# Patient Record
Sex: MALE | Employment: FULL TIME | URBAN - METROPOLITAN AREA
[De-identification: names, ages, dates, MRNs, and addresses within clinical notes are randomized per-mention and may not be internally consistent; named-entity substitution may affect disease eponyms.]

---

## 2023-03-13 ENCOUNTER — TELEPHONE (OUTPATIENT)
Dept: PRIMARY CARE | Facility: CLINIC | Age: 53
End: 2023-03-13
Payer: COMMERCIAL

## 2023-04-13 ENCOUNTER — APPOINTMENT (OUTPATIENT)
Dept: PRIMARY CARE | Facility: CLINIC | Age: 53
End: 2023-04-13
Payer: COMMERCIAL

## 2023-05-05 ENCOUNTER — OFFICE VISIT (OUTPATIENT)
Dept: PRIMARY CARE | Facility: CLINIC | Age: 53
End: 2023-05-05
Payer: COMMERCIAL

## 2023-05-05 ENCOUNTER — LAB (OUTPATIENT)
Dept: LAB | Facility: LAB | Age: 53
End: 2023-05-05
Payer: COMMERCIAL

## 2023-05-05 VITALS
OXYGEN SATURATION: 98 % | BODY MASS INDEX: 23.7 KG/M2 | WEIGHT: 160 LBS | SYSTOLIC BLOOD PRESSURE: 104 MMHG | HEIGHT: 69 IN | DIASTOLIC BLOOD PRESSURE: 66 MMHG | TEMPERATURE: 97.4 F | HEART RATE: 78 BPM

## 2023-05-05 DIAGNOSIS — E78.2 MIXED HYPERLIPIDEMIA: ICD-10-CM

## 2023-05-05 DIAGNOSIS — Z12.5 PROSTATE CANCER SCREENING: ICD-10-CM

## 2023-05-05 DIAGNOSIS — R73.01 IMPAIRED FASTING GLUCOSE: ICD-10-CM

## 2023-05-05 DIAGNOSIS — E55.9 VITAMIN D DEFICIENCY: ICD-10-CM

## 2023-05-05 DIAGNOSIS — Z12.11 SCREEN FOR COLON CANCER: ICD-10-CM

## 2023-05-05 DIAGNOSIS — Z00.01 ANNUAL VISIT FOR GENERAL ADULT MEDICAL EXAMINATION WITH ABNORMAL FINDINGS: ICD-10-CM

## 2023-05-05 DIAGNOSIS — I73.00 RAYNAUD'S DISEASE WITHOUT GANGRENE: ICD-10-CM

## 2023-05-05 DIAGNOSIS — Z00.01 ANNUAL VISIT FOR GENERAL ADULT MEDICAL EXAMINATION WITH ABNORMAL FINDINGS: Primary | ICD-10-CM

## 2023-05-05 DIAGNOSIS — E03.9 ACQUIRED HYPOTHYROIDISM: ICD-10-CM

## 2023-05-05 DIAGNOSIS — M25.561 ACUTE PAIN OF RIGHT KNEE: ICD-10-CM

## 2023-05-05 PROBLEM — E78.5 HYPERLIPIDEMIA: Status: ACTIVE | Noted: 2023-05-05

## 2023-05-05 LAB
ALANINE AMINOTRANSFERASE (SGPT) (U/L) IN SER/PLAS: 10 U/L (ref 10–52)
ALBUMIN (G/DL) IN SER/PLAS: 4.6 G/DL (ref 3.4–5)
ALKALINE PHOSPHATASE (U/L) IN SER/PLAS: 68 U/L (ref 33–120)
ANION GAP IN SER/PLAS: 11 MMOL/L (ref 10–20)
ASPARTATE AMINOTRANSFERASE (SGOT) (U/L) IN SER/PLAS: 12 U/L (ref 9–39)
BILIRUBIN TOTAL (MG/DL) IN SER/PLAS: 1.9 MG/DL (ref 0–1.2)
CALCIDIOL (25 OH VITAMIN D3) (NG/ML) IN SER/PLAS: 64 NG/ML
CALCIUM (MG/DL) IN SER/PLAS: 9.9 MG/DL (ref 8.6–10.6)
CARBON DIOXIDE, TOTAL (MMOL/L) IN SER/PLAS: 30 MMOL/L (ref 21–32)
CHLORIDE (MMOL/L) IN SER/PLAS: 105 MMOL/L (ref 98–107)
CHOLESTEROL (MG/DL) IN SER/PLAS: 195 MG/DL (ref 0–199)
CHOLESTEROL IN HDL (MG/DL) IN SER/PLAS: 64.7 MG/DL
CHOLESTEROL/HDL RATIO: 3
CREATININE (MG/DL) IN SER/PLAS: 0.88 MG/DL (ref 0.5–1.3)
ERYTHROCYTE DISTRIBUTION WIDTH (RATIO) BY AUTOMATED COUNT: 13 % (ref 11.5–14.5)
ERYTHROCYTE MEAN CORPUSCULAR HEMOGLOBIN CONCENTRATION (G/DL) BY AUTOMATED: 32.1 G/DL (ref 32–36)
ERYTHROCYTE MEAN CORPUSCULAR VOLUME (FL) BY AUTOMATED COUNT: 94 FL (ref 80–100)
ERYTHROCYTES (10*6/UL) IN BLOOD BY AUTOMATED COUNT: 4.29 X10E12/L (ref 4.5–5.9)
ESTIMATED AVERAGE GLUCOSE FOR HBA1C: 114 MG/DL
GFR MALE: >90 ML/MIN/1.73M2
GLUCOSE (MG/DL) IN SER/PLAS: 100 MG/DL (ref 74–99)
HEMATOCRIT (%) IN BLOOD BY AUTOMATED COUNT: 40.2 % (ref 41–52)
HEMOGLOBIN (G/DL) IN BLOOD: 12.9 G/DL (ref 13.5–17.5)
HEMOGLOBIN A1C/HEMOGLOBIN TOTAL IN BLOOD: 5.6 %
LDL: 115 MG/DL (ref 0–99)
LEUKOCYTES (10*3/UL) IN BLOOD BY AUTOMATED COUNT: 5.3 X10E9/L (ref 4.4–11.3)
MUCUS, URINE: NORMAL /LPF
NRBC (PER 100 WBCS) BY AUTOMATED COUNT: 0 /100 WBC (ref 0–0)
PLATELETS (10*3/UL) IN BLOOD AUTOMATED COUNT: 257 X10E9/L (ref 150–450)
POTASSIUM (MMOL/L) IN SER/PLAS: 4.6 MMOL/L (ref 3.5–5.3)
PROSTATE SPECIFIC ANTIGEN,SCREEN: 0.33 NG/ML (ref 0–4)
PROTEIN TOTAL: 7.1 G/DL (ref 6.4–8.2)
RBC, URINE: 1 /HPF (ref 0–5)
SODIUM (MMOL/L) IN SER/PLAS: 141 MMOL/L (ref 136–145)
SQUAMOUS EPITHELIAL CELLS, URINE: 1 /HPF
THYROTROPIN (MIU/L) IN SER/PLAS BY DETECTION LIMIT <= 0.05 MIU/L: 3.8 MIU/L (ref 0.44–3.98)
TRIGLYCERIDE (MG/DL) IN SER/PLAS: 78 MG/DL (ref 0–149)
URATE (MG/DL) IN SER/PLAS: 5.9 MG/DL (ref 4–7.5)
UREA NITROGEN (MG/DL) IN SER/PLAS: 11 MG/DL (ref 6–23)
VLDL: 16 MG/DL (ref 0–40)
WBC, URINE: 4 /HPF (ref 0–5)

## 2023-05-05 PROCEDURE — 80061 LIPID PANEL: CPT

## 2023-05-05 PROCEDURE — 99213 OFFICE O/P EST LOW 20 MIN: CPT | Performed by: INTERNAL MEDICINE

## 2023-05-05 PROCEDURE — 84443 ASSAY THYROID STIM HORMONE: CPT

## 2023-05-05 PROCEDURE — 84550 ASSAY OF BLOOD/URIC ACID: CPT

## 2023-05-05 PROCEDURE — 1036F TOBACCO NON-USER: CPT | Performed by: INTERNAL MEDICINE

## 2023-05-05 PROCEDURE — 82306 VITAMIN D 25 HYDROXY: CPT

## 2023-05-05 PROCEDURE — 85027 COMPLETE CBC AUTOMATED: CPT

## 2023-05-05 PROCEDURE — 84153 ASSAY OF PSA TOTAL: CPT

## 2023-05-05 PROCEDURE — 99396 PREV VISIT EST AGE 40-64: CPT | Performed by: INTERNAL MEDICINE

## 2023-05-05 PROCEDURE — 80053 COMPREHEN METABOLIC PANEL: CPT

## 2023-05-05 PROCEDURE — 83036 HEMOGLOBIN GLYCOSYLATED A1C: CPT

## 2023-05-05 PROCEDURE — 81001 URINALYSIS AUTO W/SCOPE: CPT

## 2023-05-05 PROCEDURE — 36415 COLL VENOUS BLD VENIPUNCTURE: CPT

## 2023-05-05 RX ORDER — MELOXICAM 15 MG/1
15 TABLET ORAL DAILY
Qty: 30 TABLET | Refills: 0 | Status: SHIPPED | OUTPATIENT
Start: 2023-05-05 | End: 2023-06-01

## 2023-05-05 RX ORDER — ERGOCALCIFEROL 1.25 MG/1
1 CAPSULE ORAL
COMMUNITY
Start: 2023-03-22

## 2023-05-05 ASSESSMENT — PATIENT HEALTH QUESTIONNAIRE - PHQ9
SUM OF ALL RESPONSES TO PHQ9 QUESTIONS 1 AND 2: 0
2. FEELING DOWN, DEPRESSED OR HOPELESS: NOT AT ALL
1. LITTLE INTEREST OR PLEASURE IN DOING THINGS: NOT AT ALL

## 2023-05-05 NOTE — ASSESSMENT & PLAN NOTE
52-year-old male advised skin cancer prostate cancer testicular cancer colon cancer screening flu pneumonia COVID-19 vaccines follow-up 6 months refer patient to GI

## 2023-05-05 NOTE — ASSESSMENT & PLAN NOTE
Posttraumatic injury advised x-ray of the right knee meloxicam 50 mg a day side effect explained take vitamin C vitamin D calcium supplement physical therapy follow-up

## 2023-05-08 ENCOUNTER — TELEPHONE (OUTPATIENT)
Dept: PRIMARY CARE | Facility: CLINIC | Age: 53
End: 2023-05-08
Payer: COMMERCIAL

## 2023-05-08 NOTE — TELEPHONE ENCOUNTER
----- Message from Luke Livingston MD sent at 5/8/2023  9:53 AM EDT -----  LDL cholesterol 115 advised exercise low-fat diet glucose 100 advised low-carb diet H&H is low 12.9 and 40.2 at age of 52 advised GI evaluation with EGD colonoscopy Dr. Anderosn follow-up 6 months repeat lipid sugar and blood count

## 2023-05-17 ENCOUNTER — APPOINTMENT (OUTPATIENT)
Dept: PRIMARY CARE | Facility: CLINIC | Age: 53
End: 2023-05-17
Payer: COMMERCIAL

## 2023-05-30 ASSESSMENT — ENCOUNTER SYMPTOMS
SHORTNESS OF BREATH: 0
CONSTITUTIONAL NEGATIVE: 1
WHEEZING: 0

## 2023-05-30 NOTE — PROGRESS NOTES
Subjective   Patient ID: Ramon Thopre is a 52 y.o. male who presents for No chief complaint on file..    Is pt not seeing dr alexandre anymore?  He just saw him for a physical.  Last physical: 5/5/23  Last labs-5/5/23 uric acid nl, tsh nl, psa nl, a1c 5.6, ldl 115, sugar 100, rbc/h/h low  Due for iron labs    new pt-charles-gi, others?  Did pt see gi dr?  Put names of providers in care team-not last pcp  Did pt set up colonoscopy?  Tdap-has pt had a tdap since 2001? If not would he like to update this    Family history form    Is he looking for an order for cardiac calcium score?  If yes, any FH of heart attack or heart surgery?      HPI      No care team member to display     Review of Systems   Constitutional: Negative.    Respiratory:  Negative for shortness of breath and wheezing.    Cardiovascular:  Negative for chest pain.     Objective   There were no vitals taken for this visit.   BP Readings from Last 3 Encounters:   05/05/23 104/66   02/24/22 116/77   08/30/21 103/68     Wt Readings from Last 3 Encounters:   05/05/23 72.6 kg (160 lb)   02/24/22 70.9 kg (156 lb 6.4 oz)   08/30/21 69.5 kg (153 lb 3.2 oz)       Physical Exam  Constitutional:       General: He is not in acute distress.     Appearance: Normal appearance.   Neurological:      Mental Status: He is alert.     Assessment/Plan   {Assess/PlanSmartLinks:10119}                No

## 2023-05-31 ENCOUNTER — APPOINTMENT (OUTPATIENT)
Dept: PRIMARY CARE | Facility: CLINIC | Age: 53
End: 2023-05-31
Payer: COMMERCIAL

## 2023-12-08 ENCOUNTER — TELEMEDICINE (OUTPATIENT)
Dept: PRIMARY CARE | Facility: CLINIC | Age: 53
End: 2023-12-08
Payer: COMMERCIAL

## 2023-12-08 DIAGNOSIS — J20.8 ACUTE BRONCHITIS DUE TO COVID-19 VIRUS: Primary | ICD-10-CM

## 2023-12-08 DIAGNOSIS — U07.1 ACUTE BRONCHITIS DUE TO COVID-19 VIRUS: Primary | ICD-10-CM

## 2023-12-08 PROBLEM — M25.561 RIGHT KNEE PAIN: Status: RESOLVED | Noted: 2023-05-05 | Resolved: 2023-12-08

## 2023-12-08 PROBLEM — E55.9 VITAMIN D DEFICIENCY: Status: RESOLVED | Noted: 2023-05-05 | Resolved: 2023-12-08

## 2023-12-08 PROBLEM — I73.00 RAYNAUD'S DISEASE WITHOUT GANGRENE: Status: RESOLVED | Noted: 2023-05-05 | Resolved: 2023-12-08

## 2023-12-08 PROBLEM — E78.5 HYPERLIPIDEMIA: Status: RESOLVED | Noted: 2023-05-05 | Resolved: 2023-12-08

## 2023-12-08 PROBLEM — Z00.01 ANNUAL VISIT FOR GENERAL ADULT MEDICAL EXAMINATION WITH ABNORMAL FINDINGS: Status: RESOLVED | Noted: 2023-05-05 | Resolved: 2023-12-08

## 2023-12-08 LAB
NON-UH HIE BASO COUNT: 0.03 X1000 (ref 0–0.2)
NON-UH HIE BASOS %: 0.4 %
NON-UH HIE COVID-19 BY PCR IP: NEGATIVE
NON-UH HIE D DIMER HS: 362 NG/ML FEU
NON-UH HIE DIFF?: NO
NON-UH HIE EOS COUNT: 0 X1000 (ref 0–0.5)
NON-UH HIE EOSIN %: 0 %
NON-UH HIE HCT: 37 % (ref 41–52)
NON-UH HIE HGB: 12.7 G/DL (ref 13.5–17.5)
NON-UH HIE INSTR WBC: 5.7
NON-UH HIE LDH: 146 UNIT/L (ref 120–246)
NON-UH HIE LYMPH %: 16.8 %
NON-UH HIE LYMPH COUNT: 0.96 X1000 (ref 1.2–4.8)
NON-UH HIE MCH: 30.9 PG (ref 27–34)
NON-UH HIE MCHC: 34.2 G/DL (ref 32–37)
NON-UH HIE MCV: 90.3 FL (ref 80–100)
NON-UH HIE MONO %: 12 %
NON-UH HIE MONO COUNT: 0.69 X1000 (ref 0.1–1)
NON-UH HIE MPV: 9.6 FL (ref 7.4–10.4)
NON-UH HIE NEUTROPHIL %: 70.7 %
NON-UH HIE NEUTROPHIL COUNT (ANC): 4.06 X1000 (ref 1.4–8.8)
NON-UH HIE NUCLEATED RBC: 0 /100WBC
NON-UH HIE PLATELET: 191 X10 (ref 150–450)
NON-UH HIE RAPID INFLUENZA A ANTIGEN TEST: POSITIVE
NON-UH HIE RAPID INFLUENZA B ANTIGEN TEST: NEGATIVE
NON-UH HIE RBC: 4.1 X10 (ref 4.7–6.1)
NON-UH HIE RDW: 12.9 % (ref 11.5–14.5)
NON-UH HIE RESPIRATORY SYNCYTIAL VIRUS: NEGATIVE
NON-UH HIE RFAB INT QC: PRESENT
NON-UH HIE RSV INT QC: PRESENT
NON-UH HIE WBC: 5.7 X10 (ref 4.5–11)

## 2023-12-08 PROCEDURE — 99212 OFFICE O/P EST SF 10 MIN: CPT | Performed by: INTERNAL MEDICINE

## 2023-12-08 RX ORDER — NIRMATRELVIR AND RITONAVIR 300-100 MG
KIT ORAL
Qty: 30 TABLET | Refills: 0 | Status: SHIPPED | OUTPATIENT
Start: 2023-12-08 | End: 2023-12-13

## 2023-12-08 NOTE — PROGRESS NOTES
Subjective   Patient ID: Ramon Thorpe is a 53 y.o. male who presents for Cough, Fever, and Fatigue.    Assessment/Plan     Problem List Items Addressed This Visit       Acute bronchitis due to COVID-19 virus - Primary    Relevant Medications    nirmatrelvir-ritonavir (Paxlovid) 300 mg (150 mg x 2)-100 mg tablet therapy pack       HPI 53-year-old patient complaining of the febrile illness onset acutely duration 2 days progressed acutely    Negative for loss of taste or smell    Negative for nausea vomiting diarrhea    Negative for recent traveling or skin rash    Complaining of sore throat cough congestion fever chills body ache did take the atenolol Advil does not get any better patient did receive the flu and COVID-vaccine before one of the family member have similar symptoms  Past Medical History:   Diagnosis Date    Deficiency of other specified B group vitamins 03/23/2020    Vitamin B12 deficiency    Other conditions influencing health status 11/26/2017    Knee fracture, left    Other malaise 05/24/2019    Malaise and fatigue    Pain in leg, unspecified 08/13/2014    Leg pain    Pain in right knee 11/26/2017    Arthralgia of both knees    Personal history of diseases of the blood and blood-forming organs and certain disorders involving the immune mechanism 05/24/2019    History of anemia    Personal history of other diseases of the respiratory system 03/23/2020    History of acute pharyngitis    Personal history of other diseases of the respiratory system 03/23/2020    History of common cold    Personal history of other infectious and parasitic diseases 12/20/2019    History of tinea cruris     Past Surgical History:   Procedure Laterality Date    OTHER SURGICAL HISTORY  08/30/2021    No history of surgery     No Known Allergies  Current Outpatient Medications   Medication Sig Dispense Refill    ergocalciferol (Vitamin D-2) 1.25 MG (88317 UT) capsule Take 1 capsule (1,250 mcg) by mouth.       nirmatrelvir-ritonavir (Paxlovid) 300 mg (150 mg x 2)-100 mg tablet therapy pack Take 2 tablets by mouth 2 times a day AND 1 tablet 2 times a day. Do all this for 5 days. Nirmatrelvir 2 tabs bid and ritonavir 1 tab bid. 30 tablet 0     No current facility-administered medications for this visit.     Family History   Problem Relation Name Age of Onset    Liver disease Father       Social History     Socioeconomic History    Marital status:      Spouse name: None    Number of children: None    Years of education: None    Highest education level: None   Occupational History    None   Tobacco Use    Smoking status: Never    Smokeless tobacco: Never   Substance and Sexual Activity    Alcohol use: Never    Drug use: Never    Sexual activity: None   Other Topics Concern    None   Social History Narrative    None     Social Determinants of Health     Financial Resource Strain: Not on file   Food Insecurity: Not on file   Transportation Needs: Not on file   Physical Activity: Not on file   Stress: Not on file   Social Connections: Not on file   Intimate Partner Violence: Not on file   Housing Stability: Not on file     Immunization History   Administered Date(s) Administered    [facility] SARS-CoV-2 Vaccination 12/08/2021    MMR vaccine, subcutaneous (MMR II) 02/20/2001    Moderna SARS-CoV-2 Vaccination 01/02/2022    Pfizer Purple Cap SARS-CoV-2 03/22/2021, 04/12/2021    Td (adult), unspecified 02/20/2001, 02/20/2001       Review of Systems  Review of systems is otherwise negative unless stated above or in history of present illness.    Objective   Visit Vitals  Smoking Status Never     .Doxy  This visit was completed via video audio relation to  covid 19 pandemic all issues as below that discuss and address but no physical exam was performed if it was felt that patient should be evaluated in clinic and they have been advised to follow . Patient verbally consented to visit and spent  more than 50% discuss about patient's  complaint of problem and plan    LDL 150s low-carb diet glucose 100 low fat low-carb diet mild anemia Slow Fe GI workup repeat CBC    No visits with results within 4 Month(s) from this visit.   Latest known visit with results is:   Lab on 05/05/2023   Component Date Value Ref Range Status    Vitamin D, 25-Hydroxy 05/05/2023 64  ng/mL Final    Prostate Specific Antigen,Screen 05/05/2023 0.33  0.00 - 4.00 ng/mL Final    TSH 05/05/2023 3.80  0.44 - 3.98 mIU/L Final    Cholesterol 05/05/2023 195  0 - 199 mg/dL Final    HDL 05/05/2023 64.7  mg/dL Final    Cholesterol/HDL Ratio 05/05/2023 3.0   Final    LDL 05/05/2023 115 (H)  0 - 99 mg/dL Final    VLDL 05/05/2023 16  0 - 40 mg/dL Final    Triglycerides 05/05/2023 78  0 - 149 mg/dL Final    Glucose 05/05/2023 100 (H)  74 - 99 mg/dL Final    Sodium 05/05/2023 141  136 - 145 mmol/L Final    Potassium 05/05/2023 4.6  3.5 - 5.3 mmol/L Final    Chloride 05/05/2023 105  98 - 107 mmol/L Final    Bicarbonate 05/05/2023 30  21 - 32 mmol/L Final    Anion Gap 05/05/2023 11  10 - 20 mmol/L Final    Urea Nitrogen 05/05/2023 11  6 - 23 mg/dL Final    Creatinine 05/05/2023 0.88  0.50 - 1.30 mg/dL Final    GFR MALE 05/05/2023 >90  >90 mL/min/1.73m2 Final    Calcium 05/05/2023 9.9  8.6 - 10.6 mg/dL Final    Albumin 05/05/2023 4.6  3.4 - 5.0 g/dL Final    Alkaline Phosphatase 05/05/2023 68  33 - 120 U/L Final    Total Protein 05/05/2023 7.1  6.4 - 8.2 g/dL Final    AST 05/05/2023 12  9 - 39 U/L Final    Total Bilirubin 05/05/2023 1.9 (H)  0.0 - 1.2 mg/dL Final    ALT (SGPT) 05/05/2023 10  10 - 52 U/L Final    WBC 05/05/2023 5.3  4.4 - 11.3 x10E9/L Final    nRBC 05/05/2023 0.0  0.0 - 0.0 /100 WBC Final    RBC 05/05/2023 4.29 (L)  4.50 - 5.90 x10E12/L Final    Hemoglobin 05/05/2023 12.9 (L)  13.5 - 17.5 g/dL Final    Hematocrit 05/05/2023 40.2 (L)  41.0 - 52.0 % Final    MCV 05/05/2023 94  80 - 100 fL Final    MCHC 05/05/2023 32.1  32.0 - 36.0 g/dL Final    Platelets 05/05/2023 257  150 -  450 x10E9/L Final    RDW 05/05/2023 13.0  11.5 - 14.5 % Final    Hemoglobin A1C 05/05/2023 5.6  % Final    Estimated Average Glucose 05/05/2023 114  MG/DL Final    Uric Acid 05/05/2023 5.9  4.0 - 7.5 mg/dL Final    WBC, Urine 05/05/2023 4  0 - 5 /HPF Final    RBC, Urine 05/05/2023 1  0 - 5 /HPF Final    Squamous Epithelial Cells, Urine 05/05/2023 1  /HPF Final    Mucus, Urine 05/05/2023 1+  /LPF Final       Radiology: Reviewed imaging in powerchart.  No results found.      Charting was completed using voice recognition technology and may include unintended errors.

## 2023-12-11 ENCOUNTER — TELEPHONE (OUTPATIENT)
Dept: PRIMARY CARE | Facility: CLINIC | Age: 53
End: 2023-12-11
Payer: COMMERCIAL

## 2023-12-11 DIAGNOSIS — J11.1 FLU: ICD-10-CM

## 2023-12-11 RX ORDER — OSELTAMIVIR PHOSPHATE 75 MG/1
75 CAPSULE ORAL 2 TIMES DAILY
Qty: 10 CAPSULE | Refills: 0 | Status: SHIPPED | OUTPATIENT
Start: 2023-12-11 | End: 2023-12-16

## 2024-01-29 ENCOUNTER — TELEPHONE (OUTPATIENT)
Dept: PRIMARY CARE | Facility: CLINIC | Age: 54
End: 2024-01-29
Payer: COMMERCIAL

## 2024-01-29 NOTE — TELEPHONE ENCOUNTER
Left  with information for him to call to schedule MRI. Sent order to central scheduling and left ref numbers for orthopedics in

## 2024-01-29 NOTE — TELEPHONE ENCOUNTER
PT was in to Dr Livingston and had an xray of right knee that showed no issues.  He would like a referral to ortho.  He still has pain.  He is also wondering if he should have an  MRI.    He requested to ask Dr Martin in Dr Livingston's absence.

## 2024-02-20 ENCOUNTER — TELEPHONE (OUTPATIENT)
Dept: PRIMARY CARE | Facility: CLINIC | Age: 54
End: 2024-02-20
Payer: COMMERCIAL

## 2024-08-26 ENCOUNTER — APPOINTMENT (OUTPATIENT)
Dept: PRIMARY CARE | Facility: CLINIC | Age: 54
End: 2024-08-26
Payer: COMMERCIAL

## 2024-08-26 VITALS
HEART RATE: 70 BPM | OXYGEN SATURATION: 98 % | BODY MASS INDEX: 24.59 KG/M2 | WEIGHT: 166 LBS | TEMPERATURE: 97.2 F | DIASTOLIC BLOOD PRESSURE: 67 MMHG | SYSTOLIC BLOOD PRESSURE: 103 MMHG | HEIGHT: 69 IN

## 2024-08-26 DIAGNOSIS — Z00.01 ANNUAL VISIT FOR GENERAL ADULT MEDICAL EXAMINATION WITH ABNORMAL FINDINGS: Primary | ICD-10-CM

## 2024-08-26 DIAGNOSIS — R73.01 IMPAIRED FASTING GLUCOSE: ICD-10-CM

## 2024-08-26 DIAGNOSIS — Z00.00 HEALTH CARE MAINTENANCE: ICD-10-CM

## 2024-08-26 DIAGNOSIS — R79.89 HIGH SERUM LOW-DENSITY LIPOPROTEIN (LDL): ICD-10-CM

## 2024-08-26 DIAGNOSIS — I10 HYPERTENSION, UNSPECIFIED TYPE: ICD-10-CM

## 2024-08-26 PROBLEM — U07.1 ACUTE BRONCHITIS DUE TO COVID-19 VIRUS: Status: RESOLVED | Noted: 2023-12-08 | Resolved: 2024-08-26

## 2024-08-26 PROBLEM — J20.8 ACUTE BRONCHITIS DUE TO COVID-19 VIRUS: Status: RESOLVED | Noted: 2023-12-08 | Resolved: 2024-08-26

## 2024-08-26 PROCEDURE — 3008F BODY MASS INDEX DOCD: CPT | Performed by: INTERNAL MEDICINE

## 2024-08-26 PROCEDURE — 3074F SYST BP LT 130 MM HG: CPT | Performed by: INTERNAL MEDICINE

## 2024-08-26 PROCEDURE — 3078F DIAST BP <80 MM HG: CPT | Performed by: INTERNAL MEDICINE

## 2024-08-26 PROCEDURE — 99396 PREV VISIT EST AGE 40-64: CPT | Performed by: INTERNAL MEDICINE

## 2024-08-26 PROCEDURE — 1036F TOBACCO NON-USER: CPT | Performed by: INTERNAL MEDICINE

## 2024-08-26 RX ORDER — FLUTICASONE PROPIONATE 50 MCG
1 SPRAY, SUSPENSION (ML) NASAL DAILY
Qty: 16 G | Refills: 3 | Status: SHIPPED | OUTPATIENT
Start: 2024-08-26

## 2024-08-26 ASSESSMENT — PATIENT HEALTH QUESTIONNAIRE - PHQ9
2. FEELING DOWN, DEPRESSED OR HOPELESS: NOT AT ALL
1. LITTLE INTEREST OR PLEASURE IN DOING THINGS: NOT AT ALL
SUM OF ALL RESPONSES TO PHQ9 QUESTIONS 1 AND 2: 0

## 2024-08-26 NOTE — PROGRESS NOTES
Subjective   Patient ID: Ramon Thorpe is a 54 y.o. male who presents for Annual Exam.    Assessment/Plan     Problem List Items Addressed This Visit       Impaired fasting glucose    Annual visit for general adult medical examination with abnormal findings - Primary    Relevant Orders    Colonoscopy Screening; Average Risk Patient    CBC and Auto Differential    Comprehensive Metabolic Panel    Lipid Panel    TSH with reflex to Free T4 if abnormal    Prostate Specific Antigen, Screen    Hemoglobin A1C    Urinalysis with Reflex Microscopic    Hypertension    Relevant Orders    CT cardiac scoring wo IV contrast    High serum low-density lipoprotein (LDL)       HPI 52-year-old patient  with children    Family history positive for the hypertension hyperlipidemia    Complaining of snoring at night    Family history of diabetes    Personal history of hypoglycemia    Advised to get vaccination    Order the calcium score for the heart    Refer patient for colonoscopy    For snoring given Flonase 2 spray a day before sleep    At age of 54 male skin cancer prostate cancer colon cancer and cardiac score screening flu pneumonia COVID-19 vaccines follow-up once a year  Past Medical History:   Diagnosis Date    Deficiency of other specified B group vitamins 03/23/2020    Vitamin B12 deficiency    Other conditions influencing health status 11/26/2017    Knee fracture, left    Other malaise 05/24/2019    Malaise and fatigue    Pain in leg, unspecified 08/13/2014    Leg pain    Pain in right knee 11/26/2017    Arthralgia of both knees    Personal history of diseases of the blood and blood-forming organs and certain disorders involving the immune mechanism 05/24/2019    History of anemia    Personal history of other diseases of the respiratory system 03/23/2020    History of acute pharyngitis    Personal history of other diseases of the respiratory system 03/23/2020    History of common cold    Personal history of other  "infectious and parasitic diseases 12/20/2019    History of tinea cruris     Past Surgical History:   Procedure Laterality Date    OTHER SURGICAL HISTORY  08/30/2021    No history of surgery     No Known Allergies  No current outpatient medications on file.     No current facility-administered medications for this visit.     Family History   Problem Relation Name Age of Onset    Liver disease Father       Social History     Socioeconomic History    Marital status:    Tobacco Use    Smoking status: Never    Smokeless tobacco: Never   Substance and Sexual Activity    Alcohol use: Never    Drug use: Never     Immunization History   Administered Date(s) Administered    [facility] SARS-CoV-2 Vaccination 12/08/2021    MMR vaccine, subcutaneous (MMR II) 02/20/2001    Moderna SARS-CoV-2 Vaccination 01/02/2022    Pfizer Purple Cap SARS-CoV-2 03/22/2021, 04/12/2021    SARS-CoV-2, Unspecified 11/18/2021    Td (adult), unspecified 02/20/2001, 02/20/2001       Review of Systems  Review of systems is otherwise negative unless stated above or in history of present illness.    Objective   Visit Vitals  /67   Pulse 70   Temp 36.2 °C (97.2 °F)   Ht 1.753 m (5' 9\")   Wt 75.3 kg (166 lb)   SpO2 98%   BMI 24.51 kg/m²   Smoking Status Never   BSA 1.91 m²     Physical Exam  Constitutional:       General: not in acute distress.   HENT:      Head: Normocephalic and atraumatic.      Nose: Nose normal.   Eyes:      Extraocular Movements: Extraocular movements intact.      Conjunctiva/sclera: Conjunctivae normal.   Cardiovascular:      Rate and Rhythm: Normal rate ,  No M/R/G  Pulmonary:      Effort: Pulmonary effort is normal.      Breath sounds: Normal, Bilat Equal AE  Skin:     General: Skin is warm.   Neurological:      Mental Status: He is alert and oriented to person, place, and time.   Psychiatric:         Mood and Affect: Mood normal.         Behavior: Behavior normal.   Musculoskeletal   FROM in all extremitirs,  Joint-no " swelling or tenderness    No visits with results within 4 Month(s) from this visit.   Latest known visit with results is:   Orders Only on 12/08/2023   Component Date Value Ref Range Status    [facility] D Dimer HS 12/08/2023 362  <=499 ng/mL FEU Final    [facility] LDH 12/08/2023 146  120 - 246 unit/L Final    [facility] MCH 12/08/2023 30.9  27.0 - 34.0 pg Final    [facility] HCT 12/08/2023 37.0 (L)  41.0 - 52.0 % Final    [facility] Nucleated RBC 12/08/2023 0  /100WBC Final    [facility] Platelet 12/08/2023 191  150 - 450 x10 Final    [facility] RBC 12/08/2023 4.10 (L)  4.70 - 6.10 x10 Final    [facility] MCHC 12/08/2023 34.2  32.0 - 37.0 g/dL Final    [facility] Instr WBC 12/08/2023 5.7   Final    [facility] MCV 12/08/2023 90.3  80.0 - 100.0 fL Final    [facility] MPV 12/08/2023 9.6  7.4 - 10.4 fL Final    [facility] HGB 12/08/2023 12.7 (L)  13.5 - 17.5 g/dL Final    [facility] RDW 12/08/2023 12.9  11.5 - 14.5 % Final    [facility] WBC 12/08/2023 5.7  4.5 - 11.0 x10 Final    [facility] DIFF? 12/08/2023 No   Final    [facility] Mono Count 12/08/2023 0.69  0.10 - 1.00 x1000 Final    [facility] Neutrophil Count (ANC) 12/08/2023 4.06  1.40 - 8.80 x1000 Final    [facility] Neutrophil % 12/08/2023 70.7  % Final    [facility] Eos Count 12/08/2023 0.00  0.00 - 0.50 x1000 Final    [facility] Eosin % 12/08/2023 0.0  % Final    [facility] Lymph % 12/08/2023 16.8  % Final    [facility] Lymph Count 12/08/2023 0.96 (L)  1.20 - 4.80 x1000 Final    [facility] Baso Count 12/08/2023 0.03  0.00 - 0.20 x1000 Final    [facility] Basos % 12/08/2023 0.4  % Final    [facility] Mono % 12/08/2023 12.0  % Final    [facility] Respiratory Syncytial V* 12/08/2023 Negative  Negative Final    [facility] RSV INT QC 12/08/2023 Present   Final    [facility] Rapid Influenza B Antig* 12/08/2023 Negative   Final    [facility] Rapid Influenza A Antig* 12/08/2023 Positive (A)   Final    [facility] RFAB INT QC 12/08/2023 Present   Final    [facility]  COVID-19 by PCR IP 12/08/2023 Negative   Final       Radiology: Reviewed imaging in powerchart.  No results found.      Charting was completed using voice recognition technology and may include unintended errors.

## 2024-09-11 DIAGNOSIS — Z12.11 COLON CANCER SCREENING: ICD-10-CM

## 2024-09-12 RX ORDER — SODIUM, POTASSIUM,MAG SULFATES 17.5-3.13G
SOLUTION, RECONSTITUTED, ORAL ORAL
Qty: 1 EACH | Refills: 0 | Status: SHIPPED | OUTPATIENT
Start: 2024-09-12

## 2024-10-10 ENCOUNTER — ANESTHESIA EVENT (OUTPATIENT)
Dept: GASTROENTEROLOGY | Facility: EXTERNAL LOCATION | Age: 54
End: 2024-10-10

## 2024-10-14 DIAGNOSIS — R79.89 HIGH SERUM LOW-DENSITY LIPOPROTEIN (LDL): ICD-10-CM

## 2024-10-15 RX ORDER — ATORVASTATIN CALCIUM 20 MG/1
20 TABLET, FILM COATED ORAL DAILY
Qty: 90 TABLET | Refills: 3 | Status: SHIPPED | OUTPATIENT
Start: 2024-10-15

## 2024-10-21 ENCOUNTER — APPOINTMENT (OUTPATIENT)
Dept: GASTROENTEROLOGY | Facility: EXTERNAL LOCATION | Age: 54
End: 2024-10-21
Payer: COMMERCIAL

## 2024-10-21 ENCOUNTER — ANESTHESIA (OUTPATIENT)
Dept: GASTROENTEROLOGY | Facility: EXTERNAL LOCATION | Age: 54
End: 2024-10-21

## 2024-10-21 VITALS
WEIGHT: 161 LBS | BODY MASS INDEX: 23.85 KG/M2 | OXYGEN SATURATION: 100 % | HEIGHT: 69 IN | TEMPERATURE: 97.9 F | HEART RATE: 60 BPM | RESPIRATION RATE: 14 BRPM | DIASTOLIC BLOOD PRESSURE: 76 MMHG | SYSTOLIC BLOOD PRESSURE: 113 MMHG

## 2024-10-21 DIAGNOSIS — Z00.01 ANNUAL VISIT FOR GENERAL ADULT MEDICAL EXAMINATION WITH ABNORMAL FINDINGS: Primary | ICD-10-CM

## 2024-10-21 DIAGNOSIS — Z12.11 COLON CANCER SCREENING: ICD-10-CM

## 2024-10-21 PROCEDURE — 45378 DIAGNOSTIC COLONOSCOPY: CPT | Performed by: INTERNAL MEDICINE

## 2024-10-21 RX ORDER — PROPOFOL 10 MG/ML
INJECTION, EMULSION INTRAVENOUS AS NEEDED
Status: DISCONTINUED | OUTPATIENT
Start: 2024-10-21 | End: 2024-10-21

## 2024-10-21 RX ORDER — LIDOCAINE HYDROCHLORIDE 20 MG/ML
INJECTION, SOLUTION INFILTRATION; PERINEURAL AS NEEDED
Status: DISCONTINUED | OUTPATIENT
Start: 2024-10-21 | End: 2024-10-21

## 2024-10-21 SDOH — HEALTH STABILITY: MENTAL HEALTH: CURRENT SMOKER: 0

## 2024-10-21 ASSESSMENT — ENCOUNTER SYMPTOMS
CONFUSION: 0
ABDOMINAL DISTENTION: 0
ARTHRALGIAS: 0
SLEEP DISTURBANCE: 0
UNEXPECTED WEIGHT CHANGE: 0
DIZZINESS: 0
CONSTIPATION: 0
JOINT SWELLING: 0
COLOR CHANGE: 0
HEADACHES: 0
DIFFICULTY URINATING: 0
LIGHT-HEADEDNESS: 0
DIARRHEA: 0
SPEECH DIFFICULTY: 0
SHORTNESS OF BREATH: 0
TROUBLE SWALLOWING: 0
CHILLS: 0
VOMITING: 0
FEVER: 0
ABDOMINAL PAIN: 0
COUGH: 0
WHEEZING: 0
NAUSEA: 0

## 2024-10-21 ASSESSMENT — PAIN SCALES - GENERAL
PAINLEVEL_OUTOF10: 0 - NO PAIN
PAINLEVEL_OUTOF10: 0 - NO PAIN
PAIN_LEVEL: 0
PAINLEVEL_OUTOF10: 0 - NO PAIN

## 2024-10-21 ASSESSMENT — PAIN - FUNCTIONAL ASSESSMENT
PAIN_FUNCTIONAL_ASSESSMENT: 0-10

## 2024-10-21 ASSESSMENT — COLUMBIA-SUICIDE SEVERITY RATING SCALE - C-SSRS
6. HAVE YOU EVER DONE ANYTHING, STARTED TO DO ANYTHING, OR PREPARED TO DO ANYTHING TO END YOUR LIFE?: NO
2. HAVE YOU ACTUALLY HAD ANY THOUGHTS OF KILLING YOURSELF?: NO
1. IN THE PAST MONTH, HAVE YOU WISHED YOU WERE DEAD OR WISHED YOU COULD GO TO SLEEP AND NOT WAKE UP?: NO

## 2024-10-21 NOTE — DISCHARGE INSTRUCTIONS

## 2024-10-21 NOTE — ANESTHESIA PREPROCEDURE EVALUATION
Patient: Ramon Thorpe    Procedure Information       Date/Time: 10/21/24 0920    Scheduled providers: Stas Rojas MD    Procedure: COLONOSCOPY    Location: [facility]            Relevant Problems   Cardiac   (+) Hypertension       Clinical information reviewed:    Allergies                NPO Detail:  No data recorded     Physical Exam    Airway  Mallampati: II  TM distance: >3 FB  Neck ROM: full     Cardiovascular - normal exam     Dental - normal exam     Pulmonary - normal exam     Abdominal - normal exam         Anesthesia Plan    History of general anesthesia?: no  History of complications of general anesthesia?: no    ASA 2     MAC     The patient is not a current smoker.    intravenous induction   Anesthetic plan and risks discussed with patient.

## 2024-10-21 NOTE — Clinical Note
Patient tolerated the procedure well and is comfortable with no complaints of pain. Vital signs stable. Arousable prior to transport. Patient transported to PACU via stretcher. Handoff completed. Abdomen soft and non distended.

## 2024-10-21 NOTE — H&P
History Of Present Illness  Ramon Thorpe is a 54 y.o. male presenting with Colon cancer screening     Past Medical History  Past Medical History:   Diagnosis Date    Deficiency of other specified B group vitamins 03/23/2020    Vitamin B12 deficiency    Other conditions influencing health status 11/26/2017    Knee fracture, left    Other malaise 05/24/2019    Malaise and fatigue    Pain in leg, unspecified 08/13/2014    Leg pain    Pain in right knee 11/26/2017    Arthralgia of both knees    Personal history of diseases of the blood and blood-forming organs and certain disorders involving the immune mechanism 05/24/2019    History of anemia    Personal history of other diseases of the respiratory system 03/23/2020    History of acute pharyngitis    Personal history of other diseases of the respiratory system 03/23/2020    History of common cold    Personal history of other infectious and parasitic diseases 12/20/2019    History of tinea cruris     Surgical History  Past Surgical History:   Procedure Laterality Date    OTHER SURGICAL HISTORY  08/30/2021    No history of surgery     Social History  He reports that he has never smoked. He has never used smokeless tobacco. He reports that he does not drink alcohol and does not use drugs.    Family History  Family History   Problem Relation Name Age of Onset    Liver disease Father          Allergies  No Known Allergies  Review of Systems   Constitutional:  Negative for chills, fever and unexpected weight change.   HENT:  Negative for congestion and trouble swallowing.    Respiratory:  Negative for cough, shortness of breath and wheezing.    Cardiovascular:  Negative for chest pain.   Gastrointestinal:  Negative for abdominal distention, abdominal pain, constipation, diarrhea, nausea and vomiting.   Genitourinary:  Negative for difficulty urinating.   Musculoskeletal:  Negative for arthralgias and joint swelling.   Skin:  Negative for color change.   Neurological:   "Negative for dizziness, speech difficulty, light-headedness and headaches.   Psychiatric/Behavioral:  Negative for confusion and sleep disturbance.         Physical Exam  Constitutional:       General: He is awake.      Appearance: Normal appearance.   HENT:      Head: Normocephalic and atraumatic.      Nose: Nose normal.      Mouth/Throat:      Mouth: Mucous membranes are moist.   Eyes:      Pupils: Pupils are equal, round, and reactive to light.   Neck:      Thyroid: No thyroid mass.      Trachea: Phonation normal.   Cardiovascular:      Rate and Rhythm: Normal rate and regular rhythm.      Heart sounds: Normal heart sounds. No murmur heard.     No gallop.   Pulmonary:      Effort: Pulmonary effort is normal. No respiratory distress.      Breath sounds: Normal air entry. No decreased breath sounds, wheezing, rhonchi or rales.   Abdominal:      General: Bowel sounds are normal. There is no distension.      Palpations: Abdomen is soft.      Tenderness: There is no abdominal tenderness.   Musculoskeletal:      Cervical back: Neck supple.      Right lower leg: No edema.      Left lower leg: No edema.   Skin:     General: Skin is warm.      Capillary Refill: Capillary refill takes less than 2 seconds.   Neurological:      General: No focal deficit present.      Mental Status: He is alert and oriented to person, place, and time. Mental status is at baseline.      Cranial Nerves: Cranial nerves 2-12 are intact.      Motor: Motor function is intact.   Psychiatric:         Attention and Perception: Attention and perception normal.         Mood and Affect: Mood normal.         Speech: Speech normal.         Behavior: Behavior normal.          Last Recorded Vitals  Blood pressure 116/80, pulse 75, temperature 36.6 °C (97.9 °F), resp. rate 11, height 1.753 m (5' 9\"), weight 73 kg (161 lb), SpO2 100%.    Assessment/Plan   Colon cancer screening  Colonoscopy      Stas Rojas MD  "

## 2024-10-21 NOTE — ANESTHESIA POSTPROCEDURE EVALUATION
Patient: Ramon Thorpe    Procedure Summary       Date: 10/21/24 Room / Location: [facility]    Anesthesia Start: 0917 Anesthesia Stop: 0936    Procedure: COLONOSCOPY Diagnosis:       Annual visit for general adult medical examination with abnormal findings      Annual visit for general adult medical examination with abnormal findings    Scheduled Providers: Stas Rojas MD Responsible Provider: STEPHIE Taylor    Anesthesia Type: MAC ASA Status: 2            Anesthesia Type: MAC    Vitals Value Taken Time   /69 10/21/24 0935   Temp 36.6 °C (97.9 °F) 10/21/24 0935   Pulse 66 10/21/24 0935   Resp 17 10/21/24 0935   SpO2 99 % 10/21/24 0935       Anesthesia Post Evaluation    Patient location during evaluation: PACU  Patient participation: waiting for patient participation  Level of consciousness: responsive to verbal stimuli  Pain score: 0  Pain management: adequate  Airway patency: patent  Cardiovascular status: blood pressure returned to baseline  Respiratory status: acceptable  Hydration status: acceptable  Postoperative Nausea and Vomiting: none    No notable events documented.

## 2024-12-10 ENCOUNTER — APPOINTMENT (OUTPATIENT)
Dept: RADIOLOGY | Facility: CLINIC | Age: 54
End: 2024-12-10
Payer: COMMERCIAL

## 2025-02-07 ENCOUNTER — APPOINTMENT (OUTPATIENT)
Dept: PRIMARY CARE | Facility: CLINIC | Age: 55
End: 2025-02-07
Payer: COMMERCIAL